# Patient Record
Sex: MALE | Race: WHITE | NOT HISPANIC OR LATINO | Employment: STUDENT | URBAN - NONMETROPOLITAN AREA
[De-identification: names, ages, dates, MRNs, and addresses within clinical notes are randomized per-mention and may not be internally consistent; named-entity substitution may affect disease eponyms.]

---
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
## 2020-01-03 ENCOUNTER — OFFICE VISIT (OUTPATIENT)
Dept: FAMILY MEDICINE CLINIC | Facility: CLINIC | Age: 20
End: 2020-01-03

## 2020-01-03 VITALS
SYSTOLIC BLOOD PRESSURE: 128 MMHG | BODY MASS INDEX: 26.53 KG/M2 | WEIGHT: 189.5 LBS | DIASTOLIC BLOOD PRESSURE: 80 MMHG | HEIGHT: 71 IN

## 2020-01-03 DIAGNOSIS — F34.1 DYSTHYMIA: Primary | ICD-10-CM

## 2020-01-03 DIAGNOSIS — J45.990 EXERCISE-INDUCED ASTHMA: Chronic | ICD-10-CM

## 2020-01-03 PROBLEM — Z78.9 VEGAN DIET: Chronic | Status: ACTIVE | Noted: 2017-04-26

## 2020-01-03 PROBLEM — J30.9 ALLERGIC RHINITIS: Chronic | Status: ACTIVE | Noted: 2017-04-26

## 2020-01-03 PROCEDURE — 99203 OFFICE O/P NEW LOW 30 MIN: CPT | Performed by: FAMILY MEDICINE

## 2020-01-03 RX ORDER — CITALOPRAM 10 MG/1
TABLET ORAL
Qty: 60 TABLET | Refills: 1 | Status: SHIPPED | OUTPATIENT
Start: 2020-01-03 | End: 2020-02-25

## 2020-01-03 NOTE — PROGRESS NOTES
Subjective   Kike Leonard is a 19 y.o. male.     History of Present Illness   requesting evaluation of mood swings interpersonal relationship problems possible chronic dysthymia.  Is having problems since being in college.  Long-term short-term relationships having difficulties difficulty concentrating sleep disturbance etc.  Try to get into the mental health services at his University but there is a long waiting list.  Have counseled may have to go outside emergency setting.  Has some family history of mild chronic dysthymia father is on antidepressants.  No suicidal.  Takes no regular medicines does have a tree of chronic exercise-induced asthma which is stable at present  HPI    The following portions of the patient's history were reviewed and updated as appropriate: allergies, current medications, past family history, past medical history, past social history, past surgical history and problem list.    Review of Systems  Review of Systems   Constitutional: Negative for activity change, appetite change, fatigue and unexpected weight change.   HENT: Negative for trouble swallowing and voice change.    Eyes: Negative for redness and visual disturbance.   Respiratory: Negative for cough and wheezing.    Cardiovascular: Negative for chest pain and palpitations.   Gastrointestinal: Negative for abdominal pain, constipation, diarrhea, nausea and vomiting.   Genitourinary: Negative for urgency.   Musculoskeletal: Negative for joint swelling.   Neurological: Negative for syncope and headaches.   Hematological: Negative for adenopathy.   Psychiatric/Behavioral: Positive for decreased concentration, dysphoric mood and sleep disturbance.       Objective   Physical Exam  Physical Exam   Constitutional: He is oriented to person, place, and time. He appears well-developed.   HENT:   Head: Normocephalic.   Eyes: Pupils are equal, round, and reactive to light.   Neck: Normal range of motion. No thyromegaly present.  "  Cardiovascular: Normal rate, regular rhythm and intact distal pulses. Exam reveals no gallop and no friction rub.   No murmur heard.  Pulmonary/Chest: Effort normal.   Abdominal: Soft. He exhibits no distension and no mass. There is no tenderness.   Musculoskeletal: Normal range of motion.   Neurological: He is alert and oriented to person, place, and time. He has normal reflexes.   Skin: Skin is warm and dry.   Psychiatric: He has a normal mood and affect. His speech is normal and behavior is normal. Judgment and thought content normal. Cognition and memory are normal.   Normal affect         Visit Vitals  /80   Ht 180.3 cm (71\")   Wt 86 kg (189 lb 8 oz)   BMI 26.43 kg/m²     Body mass index is 26.43 kg/m².      Assessment/Plan   Kike was seen today for establish care.    Diagnoses and all orders for this visit:    Dysthymia  -     citalopram (CeleXA) 10 MG tablet; 1qd x 7 days then 2qd till seen in follow up    Exercise-induced asthma    Counseled getting behavioral counseling and therapy in his college town.  Counseled pros and cons of medical therapy decided on low-dose SSRI for its safety profile.  Counseled following up in the counseling center where he goes have dosing adjustments.  Counseled on limitations of medication.  Rechecks directed  "

## 2020-02-24 DIAGNOSIS — F34.1 DYSTHYMIA: ICD-10-CM

## 2020-02-25 RX ORDER — CITALOPRAM 10 MG/1
TABLET ORAL
Qty: 60 TABLET | Refills: 0 | Status: SHIPPED | OUTPATIENT
Start: 2020-02-25 | End: 2020-04-14 | Stop reason: SDUPTHER

## 2020-04-14 ENCOUNTER — TELEPHONE (OUTPATIENT)
Dept: FAMILY MEDICINE CLINIC | Facility: CLINIC | Age: 20
End: 2020-04-14

## 2020-04-14 DIAGNOSIS — F34.1 DYSTHYMIA: ICD-10-CM

## 2020-04-14 RX ORDER — CITALOPRAM 10 MG/1
TABLET ORAL
Qty: 60 TABLET | Refills: 0 | Status: SHIPPED | OUTPATIENT
Start: 2020-04-14 | End: 2020-05-28

## 2020-04-14 NOTE — TELEPHONE ENCOUNTER
Pt is requesting a refill on Citalopram 10 mg to be sent to [facility]. Thanks!

## 2020-05-27 DIAGNOSIS — F34.1 DYSTHYMIA: ICD-10-CM

## 2020-05-28 RX ORDER — CITALOPRAM 20 MG/1
TABLET ORAL
Qty: 30 TABLET | Refills: 0 | Status: SHIPPED | OUTPATIENT
Start: 2020-05-28 | End: 2020-06-30 | Stop reason: SDUPTHER

## 2020-06-30 DIAGNOSIS — F34.1 DYSTHYMIA: ICD-10-CM

## 2020-06-30 RX ORDER — CITALOPRAM 20 MG/1
TABLET ORAL
Qty: 30 TABLET | Refills: 0 | OUTPATIENT
Start: 2020-06-30 | End: 2021-01-12

## 2020-06-30 NOTE — TELEPHONE ENCOUNTER
Patient called and stated that he needs a refill on his citalopram (CeleXA) 20 MG tablet Rx.    Verified [facility].    Patient can be contacted at [phone] to clarify and confirm any further needed info.

## 2021-01-12 PROCEDURE — U0003 INFECTIOUS AGENT DETECTION BY NUCLEIC ACID (DNA OR RNA); SEVERE ACUTE RESPIRATORY SYNDROME CORONAVIRUS 2 (SARS-COV-2) (CORONAVIRUS DISEASE [COVID-19]), AMPLIFIED PROBE TECHNIQUE, MAKING USE OF HIGH THROUGHPUT TECHNOLOGIES AS DESCRIBED BY CMS-2020-01-R: HCPCS | Performed by: FAMILY MEDICINE

## 2021-01-16 ENCOUNTER — TRANSCRIBE ORDERS (OUTPATIENT)
Dept: URGENT CARE | Facility: CLINIC | Age: 21
End: 2021-01-16

## 2021-01-16 DIAGNOSIS — R05.9 COUGH: Primary | ICD-10-CM

## 2021-01-16 DIAGNOSIS — J45.909 UNCOMPLICATED ASTHMA, UNSPECIFIED ASTHMA SEVERITY, UNSPECIFIED WHETHER PERSISTENT: ICD-10-CM

## 2021-01-16 DIAGNOSIS — R91.8 LEFT UPPER LOBE PULMONARY INFILTRATE: ICD-10-CM

## 2021-01-16 DIAGNOSIS — Z20.822 PERSON UNDER INVESTIGATION FOR COVID-19: ICD-10-CM

## 2021-01-16 RX ORDER — IPRATROPIUM BROMIDE AND ALBUTEROL SULFATE 2.5; .5 MG/3ML; MG/3ML
3 SOLUTION RESPIRATORY (INHALATION) EVERY 4 HOURS PRN
Qty: 360 ML | Refills: 0 | Status: SHIPPED | OUTPATIENT
Start: 2021-01-16

## 2021-01-22 ENCOUNTER — OFFICE VISIT (OUTPATIENT)
Dept: FAMILY MEDICINE CLINIC | Facility: CLINIC | Age: 21
End: 2021-01-22

## 2021-01-22 VITALS
DIASTOLIC BLOOD PRESSURE: 70 MMHG | BODY MASS INDEX: 27.07 KG/M2 | HEIGHT: 70 IN | SYSTOLIC BLOOD PRESSURE: 126 MMHG | WEIGHT: 189.1 LBS

## 2021-01-22 DIAGNOSIS — N45.1 EPIDIDYMITIS, LEFT: ICD-10-CM

## 2021-01-22 DIAGNOSIS — N50.812 TESTICULAR PAIN, LEFT: Primary | ICD-10-CM

## 2021-01-22 PROCEDURE — 99213 OFFICE O/P EST LOW 20 MIN: CPT | Performed by: FAMILY MEDICINE

## 2021-01-22 RX ORDER — DOXYCYCLINE HYCLATE 100 MG/1
CAPSULE ORAL
Qty: 20 CAPSULE | Refills: 0 | Status: SHIPPED | OUTPATIENT
Start: 2021-01-22 | End: 2021-02-10

## 2021-01-22 RX ORDER — MONTELUKAST SODIUM 10 MG/1
10 TABLET ORAL NIGHTLY
COMMUNITY
End: 2022-06-17

## 2021-01-22 NOTE — PROGRESS NOTES
Subjective   Kike Leonard is a 20 y.o. male.  Requesting evaluation of testicular pain left.  States several years ago developed epididymitis reflux type was treated conservatively.  Was told he may recur.  Is been doing a lot of coughing and sneezing during recent Covid infection.  Also does weightlifting.  Is having intermittent pain left testicle posterior pole consistent epididymitis like he had years ago.  Was on now for a week or 2.  History noted.    History of Present Illness   HPI    The following portions of the patient's history were reviewed and updated as appropriate: allergies, current medications, past family history, past medical history, past social history, past surgical history and problem list.    Review of Systems  Review of Systems   Constitutional: Negative for activity change, appetite change, fatigue and unexpected weight change.   HENT: Negative for trouble swallowing and voice change.    Eyes: Negative for redness and visual disturbance.   Respiratory: Negative for cough and wheezing.    Cardiovascular: Negative for chest pain and palpitations.   Gastrointestinal: Negative for abdominal pain, constipation, diarrhea, nausea and vomiting.   Genitourinary: Positive for testicular pain. Negative for urgency.   Musculoskeletal: Negative for joint swelling.   Neurological: Negative for syncope and headaches.   Hematological: Negative for adenopathy.   Psychiatric/Behavioral: Negative for sleep disturbance.       Objective   Physical Exam  Physical Exam  HENT:      Head: Normocephalic.   Neck:      Musculoskeletal: Normal range of motion.   Cardiovascular:      Pulses: Normal pulses.   Abdominal:      General: Abdomen is flat.   Genitourinary:     Scrotum/Testes:         Right: Tenderness not present.         Left: Tenderness (Epididymal head tender to palpation minimally swollen compared to right no other lesions small varicocele) present.      Comments: Otherwise normal  "exam  Neurological:      Mental Status: He is alert.   Psychiatric:         Attention and Perception: Attention normal.         Mood and Affect: Mood normal.         Speech: Speech normal.           Visit Vitals  /70   Ht 177.8 cm (70\")   Wt 85.8 kg (189 lb 1.6 oz)   BMI 27.13 kg/m²     Body mass index is 27.13 kg/m².      Assessment/Plan   Diagnoses and all orders for this visit:    1. Testicular pain, left (Primary)  -     US Scrotum & Testicles; Future    2. Epididymitis, left  -     US Scrotum & Testicles; Future  -     doxycycline (VIBRAMYCIN) 100 MG capsule; 1 bid x 10 days with food till gone  Dispense: 20 capsule; Refill: 0    Counseled on the lifting technique symptomatically for ice ibuprofen etc. short-term doxycycline ultrasound area follow-up based on results  "

## 2021-01-28 ENCOUNTER — HOSPITAL ENCOUNTER (OUTPATIENT)
Dept: ULTRASOUND IMAGING | Facility: HOSPITAL | Age: 21
Discharge: HOME | End: 2021-01-28

## 2021-01-28 ENCOUNTER — TELEPHONE (OUTPATIENT)
Dept: FAMILY MEDICINE CLINIC | Facility: CLINIC | Age: 21
End: 2021-01-28

## 2021-01-28 DIAGNOSIS — N45.1 EPIDIDYMITIS, LEFT: ICD-10-CM

## 2021-01-28 DIAGNOSIS — N50.812 TESTICULAR PAIN, LEFT: ICD-10-CM

## 2021-01-28 PROCEDURE — 76870 US EXAM SCROTUM: CPT

## 2021-01-28 PROCEDURE — 93976 VASCULAR STUDY: CPT

## 2021-02-10 ENCOUNTER — OFFICE VISIT (OUTPATIENT)
Dept: FAMILY MEDICINE CLINIC | Facility: CLINIC | Age: 21
End: 2021-02-10

## 2021-02-10 VITALS
BODY MASS INDEX: 27.53 KG/M2 | HEIGHT: 70 IN | SYSTOLIC BLOOD PRESSURE: 122 MMHG | WEIGHT: 192.3 LBS | DIASTOLIC BLOOD PRESSURE: 74 MMHG

## 2021-02-10 DIAGNOSIS — R10.30 INGUINAL PAIN, UNSPECIFIED LATERALITY: Primary | ICD-10-CM

## 2021-02-10 DIAGNOSIS — G57.90 ILIOINGUINAL NEURALGIA, UNSPECIFIED LATERALITY: ICD-10-CM

## 2021-02-10 PROCEDURE — 99213 OFFICE O/P EST LOW 20 MIN: CPT | Performed by: FAMILY MEDICINE

## 2021-02-10 RX ORDER — AMITRIPTYLINE HYDROCHLORIDE 25 MG/1
TABLET, FILM COATED ORAL
Qty: 40 TABLET | Refills: 1 | Status: SHIPPED | OUTPATIENT
Start: 2021-02-10 | End: 2021-03-03 | Stop reason: SDUPTHER

## 2021-02-10 NOTE — PROGRESS NOTES
"Subjective   Kike Leonard is a 20 y.o. male.  Relation testicular epididymal pain.  Repeat ultrasound done last time was essentially unremarkable for small except for small hydroceles.  Continues have intermittent sharp pains into the left epididymis now right.  More ilioinguinal radiculopathy type pain.  Does do power lifting and works out.  No other  symptoms.  History noted.    History of Present Illness   HPI    The following portions of the patient's history were reviewed and updated as appropriate: allergies, current medications, past family history, past medical history, past social history, past surgical history and problem list.    Review of Systems  Review of Systems   Constitutional: Negative for activity change, appetite change, fatigue and unexpected weight change.   HENT: Negative for trouble swallowing and voice change.    Eyes: Negative for redness and visual disturbance.   Respiratory: Negative for cough and wheezing.    Cardiovascular: Negative for chest pain and palpitations.   Gastrointestinal: Negative for abdominal pain, constipation, diarrhea, nausea and vomiting.   Genitourinary: Positive for testicular pain. Negative for urgency.   Musculoskeletal: Negative for joint swelling.   Neurological: Negative for syncope and headaches.   Hematological: Negative for adenopathy.   Psychiatric/Behavioral: Negative for sleep disturbance.       Objective   Physical Exam  Physical Exam  Constitutional:       Appearance: He is normal weight.   Neck:      Musculoskeletal: Normal range of motion.   Cardiovascular:      Rate and Rhythm: Normal rate.   Pulmonary:      Effort: Pulmonary effort is normal.   Genitourinary:     Comments: Both testes descended no epididymal swelling both inguinal rings are tender to palpation but no mass-effect.  Neurological:      Mental Status: He is alert.   Psychiatric:      Comments: No distress           Visit Vitals  /74   Ht 177.8 cm (70\")   Wt 87.2 kg " (192 lb 4.8 oz)   BMI 27.59 kg/m²     Body mass index is 27.59 kg/m².      Assessment/Plan   Diagnoses and all orders for this visit:    1. Inguinal pain, unspecified laterality (Primary)  -     amitriptyline (ELAVIL) 25 MG tablet; 1qhs x 5 nights then 2qhs for groin till seen again  Dispense: 40 tablet; Refill: 1    2. Ilioinguinal neuralgia, unspecified laterality  -     amitriptyline (ELAVIL) 25 MG tablet; 1qhs x 5 nights then 2qhs for groin till seen again  Dispense: 40 tablet; Refill: 1    Lifting technique and restrictions.  Start low-dose amitriptyline recheck 3 weeks

## 2021-03-03 ENCOUNTER — OFFICE VISIT (OUTPATIENT)
Dept: FAMILY MEDICINE CLINIC | Facility: CLINIC | Age: 21
End: 2021-03-03

## 2021-03-03 VITALS
BODY MASS INDEX: 28.12 KG/M2 | DIASTOLIC BLOOD PRESSURE: 74 MMHG | SYSTOLIC BLOOD PRESSURE: 122 MMHG | HEIGHT: 70 IN | WEIGHT: 196.4 LBS

## 2021-03-03 DIAGNOSIS — N43.3 HYDROCELE, UNSPECIFIED HYDROCELE TYPE: ICD-10-CM

## 2021-03-03 DIAGNOSIS — G57.90 ILIOINGUINAL NEURALGIA, UNSPECIFIED LATERALITY: Primary | Chronic | ICD-10-CM

## 2021-03-03 DIAGNOSIS — R10.30 INGUINAL PAIN, UNSPECIFIED LATERALITY: ICD-10-CM

## 2021-03-03 DIAGNOSIS — Z11.59 ENCOUNTER FOR HEPATITIS C SCREENING TEST FOR LOW RISK PATIENT: ICD-10-CM

## 2021-03-03 PROCEDURE — 99213 OFFICE O/P EST LOW 20 MIN: CPT | Performed by: FAMILY MEDICINE

## 2021-03-03 RX ORDER — AMITRIPTYLINE HYDROCHLORIDE 50 MG/1
TABLET, FILM COATED ORAL
Qty: 60 TABLET | Refills: 1 | Status: SHIPPED | OUTPATIENT
Start: 2021-03-03 | End: 2022-06-17

## 2021-03-03 NOTE — PROGRESS NOTES
"Subjective   Kike Leonard is a 20 y.o. male. Reevaluation groin pain ilioinguinal radiculopathy small hydrocele. States had some initial issues with anticholinergic side effects of amitriptyline but got over the other months when he started 50 mg dose. Pain is gone waist significantly only has occasional twinge during the week. Has also has adjusted lifting technique. Ultrasound confirmed a small hydroceles only. History noted.    History of Present Illness   HPI    The following portions of the patient's history were reviewed and updated as appropriate: allergies, current medications, past family history, past medical history, past social history, past surgical history and problem list.    Review of Systems  Review of Systems   Constitutional: Negative for activity change, appetite change, fatigue and unexpected weight change.   HENT: Negative for trouble swallowing and voice change.    Eyes: Negative for redness and visual disturbance.   Respiratory: Negative for cough and wheezing.    Cardiovascular: Negative for chest pain and palpitations.   Gastrointestinal: Negative for abdominal pain, constipation, diarrhea, nausea and vomiting.   Genitourinary: Positive for testicular pain (Much improved). Negative for urgency.   Musculoskeletal: Negative for joint swelling.   Neurological: Negative for syncope and headaches.   Hematological: Negative for adenopathy.   Psychiatric/Behavioral: Negative for sleep disturbance.       Objective   Physical Exam  Physical Exam  Constitutional:       Appearance: Normal appearance. He is normal weight.   Musculoskeletal:      Comments: Get up and go 3 to 5 seconds gait normal   Neurological:      Mental Status: He is alert.   Psychiatric:         Mood and Affect: Mood normal.         Speech: Speech normal.         Behavior: Behavior normal.           Visit Vitals  /74   Ht 177.8 cm (70\")   Wt 89.1 kg (196 lb 6.4 oz)   BMI 28.18 kg/m²     Body mass index is 28.18 " kg/m².      Assessment/Plan   Diagnoses and all orders for this visit:    1. Ilioinguinal neuralgia, unspecified laterality (Primary)  -     amitriptyline (ELAVIL) 50 MG tablet; 1 nightly for 8 weeks for groin  Dispense: 60 tablet; Refill: 1    2. Hydrocele, unspecified hydrocele type    3. Inguinal pain, unspecified laterality  -     amitriptyline (ELAVIL) 50 MG tablet; 1 nightly for 8 weeks for groin  Dispense: 60 tablet; Refill: 1    Counseled on lifting technique counseled on timeframe. Continue amitriptyline for the next 8 weeks then stop observation after that rechecks directed.

## 2021-03-24 ENCOUNTER — OFFICE VISIT (OUTPATIENT)
Dept: FAMILY MEDICINE CLINIC | Facility: CLINIC | Age: 21
End: 2021-03-24

## 2021-03-24 VITALS
WEIGHT: 196.1 LBS | DIASTOLIC BLOOD PRESSURE: 70 MMHG | SYSTOLIC BLOOD PRESSURE: 122 MMHG | BODY MASS INDEX: 28.07 KG/M2 | HEIGHT: 70 IN

## 2021-03-24 DIAGNOSIS — G57.90 ILIOINGUINAL NEURALGIA, UNSPECIFIED LATERALITY: Chronic | ICD-10-CM

## 2021-03-24 DIAGNOSIS — R30.0 DYSURIA: Primary | ICD-10-CM

## 2021-03-24 PROCEDURE — 99212 OFFICE O/P EST SF 10 MIN: CPT | Performed by: FAMILY MEDICINE

## 2021-03-24 NOTE — PROGRESS NOTES
Subjective   Kike Leonard is a 20 y.o. male.  Evaluation chemical dysuria.  States has had some distal urethral irritation for the past several days.  Stopped taking amitriptyline for a day or 2 symptoms actually got worse.  More just local irritation.  No drainage no swelling of the underclothing no STD exposure.  History noted.    History of Present Illness   Penis Pain  The patient's primary symptoms include penile pain. Pertinent negatives include no abdominal pain, chest pain, constipation, coughing, diarrhea, headaches, nausea, urgency or vomiting.       The following portions of the patient's history were reviewed and updated as appropriate: allergies, current medications, past family history, past medical history, past social history, past surgical history and problem list.    Review of Systems  Review of Systems   Constitutional: Negative for activity change, appetite change, fatigue and unexpected weight change.   HENT: Negative for trouble swallowing and voice change.    Eyes: Negative for redness and visual disturbance.   Respiratory: Negative for cough and wheezing.    Cardiovascular: Negative for chest pain and palpitations.   Gastrointestinal: Negative for abdominal pain, constipation, diarrhea, nausea and vomiting.   Genitourinary: Positive for penile pain. Negative for urgency.   Musculoskeletal: Negative for joint swelling.   Neurological: Negative for syncope and headaches.   Hematological: Negative for adenopathy.   Psychiatric/Behavioral: Negative for sleep disturbance.       Objective   Physical Exam  Physical Exam  Constitutional:       Appearance: Normal appearance. He is normal weight.   Genitourinary:     Comments: No urethral discharge there is slight redundancy of distal urethral skin and mucous membrane small blocked oil gland distally.  Otherwise normal.  Neurological:      Mental Status: He is alert.   Psychiatric:         Attention and Perception: Attention normal.          "Mood and Affect: Mood normal.         Speech: Speech normal.           Visit Vitals  /70   Ht 177.8 cm (70\")   Wt 89 kg (196 lb 1.6 oz)   BMI 28.14 kg/m²     Body mass index is 28.14 kg/m².      Assessment/Plan   Diagnoses and all orders for this visit:    1. Dysuria (Primary)    2. Ilioinguinal neuralgia, unspecified laterality    Sinew medicines as outlined counseled on no routine bathing routine soap and water compresses no manipulation.  Has appointment with urology in a week and a half counseled keeping same rechecks direct  "

## 2021-04-08 ENCOUNTER — TELEPHONE (OUTPATIENT)
Dept: FAMILY MEDICINE CLINIC | Facility: CLINIC | Age: 21
End: 2021-04-08

## 2021-04-08 NOTE — TELEPHONE ENCOUNTER
Pt called to request referral to [facility].    Please contact pt with questions.    [phone]

## 2021-04-09 DIAGNOSIS — N35.911 STRICTURE OF URETHRAL MEATUS IN MALE, UNSPECIFIED STRICTURE TYPE: Primary | ICD-10-CM

## 2021-05-17 ENCOUNTER — TELEPHONE (OUTPATIENT)
Dept: FAMILY MEDICINE CLINIC | Facility: CLINIC | Age: 21
End: 2021-05-17

## 2021-05-17 NOTE — TELEPHONE ENCOUNTER
Pt is requesting lab work for hormone levels. Says was just seen in March, does he need an apppoiment?  Please call, thanks!

## 2021-11-15 ENCOUNTER — TELEPHONE (OUTPATIENT)
Dept: FAMILY MEDICINE CLINIC | Facility: CLINIC | Age: 21
End: 2021-11-15

## 2021-11-15 NOTE — TELEPHONE ENCOUNTER
"Patient called wanting to speak to Dr. 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 All the information he would give me is that he was \"possibly exposed to covid19 via CPR\"     He wouldn't give any other info except he wanted to talk to Dr. Gonzalez      Please Advise    Thanks  "

## 2022-06-17 ENCOUNTER — LAB (OUTPATIENT)
Dept: LAB | Facility: HOSPITAL | Age: 22
End: 2022-06-17

## 2022-06-17 ENCOUNTER — OFFICE VISIT (OUTPATIENT)
Dept: FAMILY MEDICINE CLINIC | Facility: CLINIC | Age: 22
End: 2022-06-17

## 2022-06-17 VITALS
HEIGHT: 70 IN | DIASTOLIC BLOOD PRESSURE: 70 MMHG | SYSTOLIC BLOOD PRESSURE: 118 MMHG | BODY MASS INDEX: 29.82 KG/M2 | WEIGHT: 208.3 LBS

## 2022-06-17 DIAGNOSIS — J30.89 NON-SEASONAL ALLERGIC RHINITIS DUE TO OTHER ALLERGIC TRIGGER: Primary | Chronic | ICD-10-CM

## 2022-06-17 DIAGNOSIS — Z11.59 NEED FOR HEPATITIS C SCREENING TEST: ICD-10-CM

## 2022-06-17 DIAGNOSIS — R53.83 FATIGUE, UNSPECIFIED TYPE: ICD-10-CM

## 2022-06-17 DIAGNOSIS — R06.83 SNORING: ICD-10-CM

## 2022-06-17 DIAGNOSIS — Z13.220 SCREENING CHOLESTEROL LEVEL: ICD-10-CM

## 2022-06-17 DIAGNOSIS — Z11.4 SCREENING FOR HIV (HUMAN IMMUNODEFICIENCY VIRUS): ICD-10-CM

## 2022-06-17 DIAGNOSIS — J45.990 EXERCISE-INDUCED ASTHMA: Chronic | ICD-10-CM

## 2022-06-17 PROBLEM — G57.90 ILIOINGUINAL NEURALGIA: Chronic | Status: RESOLVED | Noted: 2021-02-10 | Resolved: 2022-06-17

## 2022-06-17 PROBLEM — Z78.9 VEGAN DIET: Chronic | Status: RESOLVED | Noted: 2017-04-26 | Resolved: 2022-06-17

## 2022-06-17 LAB
ALBUMIN SERPL-MCNC: 4.6 G/DL (ref 3.5–5.2)
ALBUMIN/GLOB SERPL: 1.9 G/DL
ALP SERPL-CCNC: 109 U/L (ref 39–117)
ALT SERPL W P-5'-P-CCNC: 18 U/L (ref 1–41)
ANION GAP SERPL CALCULATED.3IONS-SCNC: 12 MMOL/L (ref 5–15)
AST SERPL-CCNC: 22 U/L (ref 1–40)
BILIRUB SERPL-MCNC: 0.5 MG/DL (ref 0–1.2)
BUN SERPL-MCNC: 24 MG/DL (ref 6–20)
BUN/CREAT SERPL: 20.2 (ref 7–25)
CALCIUM SPEC-SCNC: 9.5 MG/DL (ref 8.6–10.5)
CHLORIDE SERPL-SCNC: 103 MMOL/L (ref 98–107)
CHOLEST SERPL-MCNC: 130 MG/DL (ref 0–200)
CO2 SERPL-SCNC: 24 MMOL/L (ref 22–29)
CREAT SERPL-MCNC: 1.19 MG/DL (ref 0.76–1.27)
EGFRCR SERPLBLD CKD-EPI 2021: 88.6 ML/MIN/1.73
GLOBULIN UR ELPH-MCNC: 2.4 GM/DL
GLUCOSE SERPL-MCNC: 95 MG/DL (ref 65–99)
HCV AB SER DONR QL: NORMAL
HDLC SERPL-MCNC: 42 MG/DL (ref 40–60)
HIV1+2 AB SER QL: NORMAL
LDLC SERPL CALC-MCNC: 71 MG/DL (ref 0–100)
LDLC/HDLC SERPL: 1.69 {RATIO}
POTASSIUM SERPL-SCNC: 4.1 MMOL/L (ref 3.5–5.2)
PROT SERPL-MCNC: 7 G/DL (ref 6–8.5)
SODIUM SERPL-SCNC: 139 MMOL/L (ref 136–145)
T4 FREE SERPL-MCNC: 1.46 NG/DL (ref 0.93–1.7)
TRIGL SERPL-MCNC: 86 MG/DL (ref 0–150)
TSH SERPL DL<=0.05 MIU/L-ACNC: 2.24 UIU/ML (ref 0.27–4.2)
VIT B12 BLD-MCNC: 1035 PG/ML (ref 211–946)
VLDLC SERPL-MCNC: 17 MG/DL (ref 5–40)

## 2022-06-17 PROCEDURE — 84403 ASSAY OF TOTAL TESTOSTERONE: CPT | Performed by: FAMILY MEDICINE

## 2022-06-17 PROCEDURE — 84439 ASSAY OF FREE THYROXINE: CPT | Performed by: FAMILY MEDICINE

## 2022-06-17 PROCEDURE — 99214 OFFICE O/P EST MOD 30 MIN: CPT | Performed by: FAMILY MEDICINE

## 2022-06-17 PROCEDURE — 84402 ASSAY OF FREE TESTOSTERONE: CPT | Performed by: FAMILY MEDICINE

## 2022-06-17 PROCEDURE — 80061 LIPID PANEL: CPT | Performed by: FAMILY MEDICINE

## 2022-06-17 PROCEDURE — 36415 COLL VENOUS BLD VENIPUNCTURE: CPT | Performed by: FAMILY MEDICINE

## 2022-06-17 PROCEDURE — G0432 EIA HIV-1/HIV-2 SCREEN: HCPCS | Performed by: FAMILY MEDICINE

## 2022-06-17 PROCEDURE — 80050 GENERAL HEALTH PANEL: CPT | Performed by: FAMILY MEDICINE

## 2022-06-17 PROCEDURE — 86803 HEPATITIS C AB TEST: CPT | Performed by: FAMILY MEDICINE

## 2022-06-17 PROCEDURE — 82607 VITAMIN B-12: CPT | Performed by: FAMILY MEDICINE

## 2022-06-17 RX ORDER — FLUTICASONE PROPIONATE 50 MCG
2 SPRAY, SUSPENSION (ML) NASAL DAILY
Qty: 48 G | Refills: 3 | Status: SHIPPED | OUTPATIENT
Start: 2022-06-17

## 2022-06-17 NOTE — PROGRESS NOTES
Subjective   Kike Leonard is a 21 y.o. male.  Requesting evaluation generalized chronic fatigue.  Patient states has generalized fatigue off and on for some several months.  Has some associated sleep disturbance related to what he thinks may be job stress and possibly excessive snoring.  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  Has an element of exercise asthma that is controlled.  Also has an element of chronic allergic rhinitis that is not controlled.'s had a problem with a nasal fracture as mentioned back in the spring.  Does exercise regularly.  No specific symptomatology.  Is no longer vegan and has not been for some time.    History of Present Illness   HPI    The following portions of the patient's history were reviewed and updated as appropriate: allergies, current medications, past family history, past medical history, past social history, past surgical history and problem list.    Review of Systems  Review of Systems   Constitutional: Positive for fatigue. Negative for activity change, appetite change and unexpected weight change.   HENT: Negative for trouble swallowing and voice change.    Eyes: Negative for redness and visual disturbance.   Respiratory: Negative for cough and wheezing.    Cardiovascular: Negative for chest pain and palpitations.   Gastrointestinal: Negative for abdominal pain, constipation, diarrhea, nausea and vomiting.   Genitourinary: Negative for urgency.   Musculoskeletal: Negative for joint swelling.   Neurological: Negative for syncope and headaches.   Hematological: Negative for adenopathy.   Psychiatric/Behavioral: Positive for sleep disturbance.       Objective   Physical Exam  Physical Exam  Constitutional:       Appearance: He is well-developed.   HENT:      Head: Normocephalic.   Eyes:      Pupils: Pupils are equal, round, and reactive to light.   Neck:      Thyroid: No thyromegaly.  "  Cardiovascular:      Rate and Rhythm: Normal rate and regular rhythm.      Heart sounds: Normal heart sounds. No murmur heard.    No friction rub. No gallop.   Pulmonary:      Breath sounds: Normal breath sounds.   Abdominal:      General: There is no distension.      Palpations: Abdomen is soft. There is no mass.      Tenderness: There is no abdominal tenderness.   Musculoskeletal:         General: Normal range of motion.      Cervical back: Normal range of motion.      Comments: Get up and go 3 to 5 seconds gait normal heavily muscled.   Skin:     General: Skin is warm and dry.   Neurological:      Mental Status: He is alert and oriented to person, place, and time.      Deep Tendon Reflexes: Reflexes are normal and symmetric.   Psychiatric:         Attention and Perception: Attention normal.         Mood and Affect: Mood normal.         Speech: Speech normal.         Behavior: Behavior normal.         Thought Content: Thought content normal.         Cognition and Memory: Cognition normal.           Visit Vitals  /70   Ht 177.8 cm (70\")   Wt 94.5 kg (208 lb 4.8 oz)   BMI 29.89 kg/m²     Body mass index is 29.89 kg/m².  /70   Ht 177.8 cm (70\")   Wt 94.5 kg (208 lb 4.8 oz)   BMI 29.89 kg/m²       Assessment/Plan   Diagnoses and all orders for this visit:    1. Non-seasonal allergic rhinitis due to other allergic trigger (Primary)    2. Exercise-induced asthma    3. Fatigue, unspecified type  -     CBC (No Diff)  -     Comprehensive Metabolic Panel  -     TSH  -     T4, Free  -     Vitamin B12  -     Testosterone (Free & Total), LC / MS  -     Ambulatory Referral to Sleep Medicine    4. Snoring  -     Ambulatory Referral to Sleep Medicine    5. Need for hepatitis C screening test  -     Hepatitis C Antibody    6. Screening cholesterol level  -     Lipid Panel With LDL / HDL Ratio    7. Screening for HIV (human immunodeficiency virus)  -     HIV-1 & HIV-2 Antibodies    Counseled on Whit pot use " chronic use of inhaled nasal steroids for allergies.  Counseled on probably need of sleep study to rule out sleep apnea.  Lab work done both for screening and at request.  Follow-up based on results.

## 2022-06-18 LAB
DEPRECATED RDW RBC AUTO: 36.9 FL (ref 37–54)
ERYTHROCYTE [DISTWIDTH] IN BLOOD BY AUTOMATED COUNT: 11.9 % (ref 12.3–15.4)
HCT VFR BLD AUTO: 47.2 % (ref 37.5–51)
HGB BLD-MCNC: 15.8 G/DL (ref 13–17.7)
MCH RBC QN AUTO: 28.7 PG (ref 26.6–33)
MCHC RBC AUTO-ENTMCNC: 33.5 G/DL (ref 31.5–35.7)
MCV RBC AUTO: 85.8 FL (ref 79–97)
PLATELET # BLD AUTO: 241 10*3/MM3 (ref 140–450)
PMV BLD AUTO: 9.8 FL (ref 6–12)
RBC # BLD AUTO: 5.5 10*6/MM3 (ref 4.14–5.8)
WBC NRBC COR # BLD: 4.84 10*3/MM3 (ref 3.4–10.8)

## 2022-06-22 LAB
TESTOST FREE SERPL-MCNC: 12.4 PG/ML (ref 9.3–26.5)
TESTOST SERPL-MCNC: 621.5 NG/DL (ref 264–916)

## 2022-08-31 ENCOUNTER — TELEPHONE (OUTPATIENT)
Dept: FAMILY MEDICINE CLINIC | Facility: CLINIC | Age: 22
End: 2022-08-31

## 2022-08-31 NOTE — TELEPHONE ENCOUNTER
Patient called stating he is at [facility] and was wanting to know if Dr. Gonzalez could do a video visit with him or if he could just order labs. Patient said he was needing his testristrone checked but was not able to drive 2 hrs to [place]. Patient wanted to know if the orders could be sent to [facility] in [place]. Please call [phone]    [facility] [address]  [phone]

## 2023-04-10 ENCOUNTER — OFFICE VISIT (OUTPATIENT)
Dept: FAMILY MEDICINE CLINIC | Facility: CLINIC | Age: 23
End: 2023-04-10
Payer: COMMERCIAL

## 2023-04-10 VITALS
WEIGHT: 206.3 LBS | BODY MASS INDEX: 29.54 KG/M2 | SYSTOLIC BLOOD PRESSURE: 122 MMHG | DIASTOLIC BLOOD PRESSURE: 78 MMHG | HEIGHT: 70 IN

## 2023-04-10 DIAGNOSIS — B07.9 VIRAL WARTS, UNSPECIFIED TYPE: ICD-10-CM

## 2023-04-10 DIAGNOSIS — L30.4 INTERTRIGO: ICD-10-CM

## 2023-04-10 DIAGNOSIS — R21 RASH: Primary | ICD-10-CM

## 2023-04-10 PROCEDURE — 99213 OFFICE O/P EST LOW 20 MIN: CPT | Performed by: FAMILY MEDICINE

## 2023-04-10 RX ORDER — NYSTATIN AND TRIAMCINOLONE ACETONIDE 100000; 1 [USP'U]/G; MG/G
OINTMENT TOPICAL
Qty: 60 G | Refills: 3 | Status: SHIPPED | OUTPATIENT
Start: 2023-04-10

## 2023-04-10 NOTE — PROGRESS NOTES
"Subjective   Kike Leonard is a 22 y.o. male.  Requesting recurrent rash evaluation but in the groin.  Appears been seen by urgent care and dermatology..  Dermatology was using liquid nitrogen what sounds like viral warts in the groin.  Very difficult to get an absolute straight line timeframe.    History of Present Illness   HPI    The following portions of the patient's history were reviewed and updated as appropriate: allergies, current medications, past family history, past medical history, past social history, past surgical history and problem list.    Review of Systems  Review of Systems   Constitutional: Negative for activity change, appetite change, fatigue and unexpected weight change.   HENT: Negative for trouble swallowing and voice change.    Eyes: Negative for redness and visual disturbance.   Respiratory: Negative for cough and wheezing.    Cardiovascular: Negative for chest pain and palpitations.   Gastrointestinal: Negative for abdominal pain, constipation, diarrhea, nausea and vomiting.   Genitourinary: Negative for urgency.   Musculoskeletal: Negative for joint swelling.   Skin: Positive for rash.   Neurological: Negative for syncope and headaches.   Hematological: Negative for adenopathy.   Psychiatric/Behavioral: Negative for sleep disturbance.       Objective   Physical Exam  Physical Exam  Constitutional:       Appearance: Normal appearance.   Skin:     Comments: Mild intertrigo in the inner inner creases of the groin.  He does have 2 or 3 raised probable papilloma viruses in the groin crease and 2 or 3 more that are mildly irritated.   Neurological:      Mental Status: He is alert.           Visit Vitals  /78   Ht 177.8 cm (70\")   Wt 93.6 kg (206 lb 4.8 oz)   BMI 29.60 kg/m²     Body mass index is 29.6 kg/m².  /78   Ht 177.8 cm (70\")   Wt 93.6 kg (206 lb 4.8 oz)   BMI 29.60 kg/m²       Assessment/Plan   Diagnoses and all orders for this visit:    1. Rash (Primary)  -  "    nystatin-triamcinolone (MYCOLOG) 577506-9.1 UNIT/GM-% ointment; Right affected area twice daily for 2 weeks  Dispense: 60 g; Refill: 3    2. Intertrigo  -     nystatin-triamcinolone (MYCOLOG) 889572-6.1 UNIT/GM-% ointment; Right affected area twice daily for 2 weeks  Dispense: 60 g; Refill: 3    3. Viral warts, unspecified type    Counseled on two-step process.  First off to cool water mild soap and air dry intertrigo treatment.  Clean up rash then return in 4 weeks and then we will see about  for the other

## 2023-05-08 ENCOUNTER — OFFICE VISIT (OUTPATIENT)
Dept: FAMILY MEDICINE CLINIC | Facility: CLINIC | Age: 23
End: 2023-05-08
Payer: COMMERCIAL

## 2023-05-08 VITALS
SYSTOLIC BLOOD PRESSURE: 120 MMHG | HEIGHT: 70 IN | WEIGHT: 207.8 LBS | DIASTOLIC BLOOD PRESSURE: 82 MMHG | HEART RATE: 75 BPM | OXYGEN SATURATION: 98 % | BODY MASS INDEX: 29.75 KG/M2

## 2023-05-08 DIAGNOSIS — Z02.9 ADMINISTRATIVE ENCOUNTER: ICD-10-CM

## 2023-05-08 DIAGNOSIS — B07.9 VIRAL WARTS, UNSPECIFIED TYPE: ICD-10-CM

## 2023-05-08 DIAGNOSIS — R21 RASH: Primary | ICD-10-CM

## 2023-05-08 NOTE — PROGRESS NOTES
Subjective   Kike Leonard is a 22 y.o. male.  Follow-up previous rash.  Patient has treated the viral wart with Compound W they are now gone.  Exam of the groin area shows couple of skin tags inner thigh but otherwise no new lesions all lesions are healing up.  Without any sign of rash.  Intertrigo is gone as well.  Also needing a letter for potential joining the  about previous diagnoses which are now not active.  Also needs pulmonary function study because of history of asthma.  These were ordered.    History of Present Illness   HPI    The following portions of the patient's history were reviewed and updated as appropriate: allergies, current medications, past family history, past medical history, past social history, past surgical history and problem list.    Review of Systems  Review of Systems   Constitutional: Negative for activity change, appetite change, fatigue and unexpected weight change.   HENT: Negative for trouble swallowing and voice change.    Eyes: Negative for redness and visual disturbance.   Respiratory: Negative for cough and wheezing.    Cardiovascular: Negative for chest pain and palpitations.   Gastrointestinal: Negative for abdominal pain, constipation, diarrhea, nausea and vomiting.   Genitourinary: Negative for urgency.   Musculoskeletal: Negative for joint swelling.   Neurological: Negative for syncope and headaches.   Hematological: Negative for adenopathy.   Psychiatric/Behavioral: Negative for sleep disturbance.       Objective   Physical Exam  Physical Exam  Constitutional:       Appearance: Normal appearance. He is normal weight.   Skin:     Comments: No lesions as mentioned   Neurological:      Mental Status: He is alert.   Psychiatric:         Attention and Perception: Attention normal.         Mood and Affect: Mood normal.         Speech: Speech normal.         Behavior: Behavior normal.         Thought Content: Thought content normal.         Cognition and  "Memory: Cognition normal.           Visit Vitals  /82   Pulse 75   Ht 177.8 cm (70\")   Wt 94.3 kg (207 lb 12.8 oz)   SpO2 98%   BMI 29.82 kg/m²     Body mass index is 29.82 kg/m².  /82   Pulse 75   Ht 177.8 cm (70\")   Wt 94.3 kg (207 lb 12.8 oz)   SpO2 98%   BMI 29.82 kg/m²       Assessment/Plan   Diagnoses and all orders for this visit:    1. Rash (Primary)    2. Viral warts, unspecified type    3. Administrative encounter  -     Full Pulmonary Function Test Without Bronchodilator; Future    Continue good wound care recheck as needed  "

## 2023-05-08 NOTE — LETTER
May 8, 2023     Patient: Kike Leonard   YOB: 2000   Date of Visit: 5/8/2023       To Whom it May Concern:    Kike Leonard was seen in my clinic on 5/8/2023.  Patient exhibits no current findings consistent with any type of psychiatric condition or asthma.  Patient is fit for for all duties and responsibilities.         Sincerely,          Elías Gonzalez MD        CC: No Recipients

## 2024-11-20 ENCOUNTER — APPOINTMENT (OUTPATIENT)
Dept: URBAN - METROPOLITAN AREA CLINIC 304 | Age: 24
Setting detail: DERMATOLOGY
End: 2024-11-21

## 2024-11-20 DIAGNOSIS — B07.8 OTHER VIRAL WARTS: ICD-10-CM

## 2024-11-20 PROCEDURE — OTHER COUNSELING: OTHER

## 2024-11-20 PROCEDURE — OTHER LIQUID NITROGEN: OTHER

## 2024-11-20 PROCEDURE — OTHER MIPS QUALITY: OTHER

## 2024-11-20 PROCEDURE — 17110 DESTRUCT B9 LESION 1-14: CPT

## 2024-11-20 ASSESSMENT — LOCATION SIMPLE DESCRIPTION DERM: LOCATION SIMPLE: LEFT GREAT TOE

## 2024-11-20 ASSESSMENT — LOCATION DETAILED DESCRIPTION DERM
LOCATION DETAILED: LEFT DORSAL GREAT TOE
LOCATION DETAILED: LEFT MEDIAL GREAT TOE

## 2024-11-20 ASSESSMENT — LOCATION ZONE DERM: LOCATION ZONE: TOE

## 2024-11-20 NOTE — PROCEDURE: COUNSELING
Spoke with pt and advised of recommendations. Pt agreeable to plan. Aware of symptoms to call for concern.   Detail Level: Detailed